# Patient Record
Sex: FEMALE | Race: WHITE | NOT HISPANIC OR LATINO | Employment: FULL TIME | URBAN - METROPOLITAN AREA
[De-identification: names, ages, dates, MRNs, and addresses within clinical notes are randomized per-mention and may not be internally consistent; named-entity substitution may affect disease eponyms.]

---

## 2023-02-28 LAB
ERYTHROCYTE DISTRIBUTION WIDTH (RATIO) BY AUTOMATED COUNT: 13.5 % (ref 11.5–14.5)
ERYTHROCYTE MEAN CORPUSCULAR HEMOGLOBIN CONCENTRATION (G/DL) BY AUTOMATED: 30.4 G/DL (ref 32–36)
ERYTHROCYTE MEAN CORPUSCULAR VOLUME (FL) BY AUTOMATED COUNT: 88 FL (ref 80–100)
ERYTHROCYTES (10*6/UL) IN BLOOD BY AUTOMATED COUNT: 4.22 X10E12/L (ref 4–5.2)
HEMATOCRIT (%) IN BLOOD BY AUTOMATED COUNT: 37.2 % (ref 36–46)
HEMOGLOBIN (G/DL) IN BLOOD: 11.3 G/DL (ref 12–16)
LEUKOCYTES (10*3/UL) IN BLOOD BY AUTOMATED COUNT: 6.5 X10E9/L (ref 4.4–11.3)
NRBC (PER 100 WBCS) BY AUTOMATED COUNT: 0 /100 WBC (ref 0–0)
PLATELETS (10*3/UL) IN BLOOD AUTOMATED COUNT: 244 X10E9/L (ref 150–450)

## 2023-05-31 ENCOUNTER — HOSPITAL ENCOUNTER (OUTPATIENT)
Dept: DATA CONVERSION | Facility: HOSPITAL | Age: 63
End: 2023-05-31
Attending: OBSTETRICS & GYNECOLOGY | Admitting: OBSTETRICS & GYNECOLOGY
Payer: COMMERCIAL

## 2023-05-31 DIAGNOSIS — N80.03 ADENOMYOSIS OF THE UTERUS: ICD-10-CM

## 2023-05-31 DIAGNOSIS — N85.02 ENDOMETRIAL INTRAEPITHELIAL NEOPLASIA (EIN): ICD-10-CM

## 2023-05-31 DIAGNOSIS — D27.1 BENIGN NEOPLASM OF LEFT OVARY: ICD-10-CM

## 2023-05-31 DIAGNOSIS — D25.9 LEIOMYOMA OF UTERUS, UNSPECIFIED: ICD-10-CM

## 2023-05-31 DIAGNOSIS — N83.201 UNSPECIFIED OVARIAN CYST, RIGHT SIDE: ICD-10-CM

## 2023-05-31 DIAGNOSIS — N85.00 ENDOMETRIAL HYPERPLASIA, UNSPECIFIED: ICD-10-CM

## 2023-05-31 DIAGNOSIS — Z88.0 ALLERGY STATUS TO PENICILLIN: ICD-10-CM

## 2023-06-13 LAB
COMPLETE PATHOLOGY REPORT: NORMAL
CONVERTED CLINICAL DIAGNOSIS-HISTORY: NORMAL
CONVERTED FINAL DIAGNOSIS: NORMAL
CONVERTED FINAL REPORT PDF LINK TO COPY AND PASTE: NORMAL
CONVERTED GROSS DESCRIPTION: NORMAL

## 2023-07-17 ASSESSMENT — PROMIS GLOBAL HEALTH SCALE
RATE_GENERAL_HEALTH: EXCELLENT
RATE_SOCIAL_SATISFACTION: VERY GOOD
RATE_QUALITY_OF_LIFE: EXCELLENT
EMOTIONAL_PROBLEMS: RARELY
RATE_AVERAGE_PAIN: 0
RATE_MENTAL_HEALTH: GOOD
RATE_PHYSICAL_HEALTH: EXCELLENT
CARRYOUT_SOCIAL_ACTIVITIES: VERY GOOD
RATE_AVERAGE_FATIGUE: MILD
CARRYOUT_PHYSICAL_ACTIVITIES: COMPLETELY

## 2023-07-18 ENCOUNTER — OFFICE VISIT (OUTPATIENT)
Dept: PRIMARY CARE | Facility: CLINIC | Age: 63
End: 2023-07-18
Payer: COMMERCIAL

## 2023-07-18 VITALS
HEART RATE: 68 BPM | WEIGHT: 156 LBS | BODY MASS INDEX: 25.07 KG/M2 | DIASTOLIC BLOOD PRESSURE: 80 MMHG | SYSTOLIC BLOOD PRESSURE: 128 MMHG | HEIGHT: 66 IN

## 2023-07-18 DIAGNOSIS — Z00.00 ROUTINE GENERAL MEDICAL EXAMINATION AT A HEALTH CARE FACILITY: Primary | ICD-10-CM

## 2023-07-18 LAB
ALANINE AMINOTRANSFERASE (SGPT) (U/L) IN SER/PLAS: 14 U/L (ref 7–45)
ALBUMIN (G/DL) IN SER/PLAS: 4.3 G/DL (ref 3.4–5)
ALKALINE PHOSPHATASE (U/L) IN SER/PLAS: 69 U/L (ref 33–136)
ANION GAP IN SER/PLAS: 13 MMOL/L (ref 10–20)
ASPARTATE AMINOTRANSFERASE (SGOT) (U/L) IN SER/PLAS: 18 U/L (ref 9–39)
BILIRUBIN TOTAL (MG/DL) IN SER/PLAS: 0.5 MG/DL (ref 0–1.2)
CALCIUM (MG/DL) IN SER/PLAS: 9.6 MG/DL (ref 8.6–10.6)
CARBON DIOXIDE, TOTAL (MMOL/L) IN SER/PLAS: 27 MMOL/L (ref 21–32)
CHLORIDE (MMOL/L) IN SER/PLAS: 103 MMOL/L (ref 98–107)
CHOLESTEROL (MG/DL) IN SER/PLAS: 268 MG/DL (ref 0–199)
CHOLESTEROL IN HDL (MG/DL) IN SER/PLAS: 88.1 MG/DL
CHOLESTEROL/HDL RATIO: 3
CREATININE (MG/DL) IN SER/PLAS: 0.64 MG/DL (ref 0.5–1.05)
ESTIMATED AVERAGE GLUCOSE FOR HBA1C: 103 MG/DL
GFR FEMALE: >90 ML/MIN/1.73M2
GLUCOSE (MG/DL) IN SER/PLAS: 85 MG/DL (ref 74–99)
HEMOGLOBIN A1C/HEMOGLOBIN TOTAL IN BLOOD: 5.2 %
LDL: 155 MG/DL (ref 0–99)
POTASSIUM (MMOL/L) IN SER/PLAS: 4.2 MMOL/L (ref 3.5–5.3)
PROTEIN TOTAL: 7.1 G/DL (ref 6.4–8.2)
SODIUM (MMOL/L) IN SER/PLAS: 139 MMOL/L (ref 136–145)
THYROTROPIN (MIU/L) IN SER/PLAS BY DETECTION LIMIT <= 0.05 MIU/L: 2.29 MIU/L (ref 0.44–3.98)
TRIGLYCERIDE (MG/DL) IN SER/PLAS: 126 MG/DL (ref 0–149)
UREA NITROGEN (MG/DL) IN SER/PLAS: 18 MG/DL (ref 6–23)
VLDL: 25 MG/DL (ref 0–40)

## 2023-07-18 PROCEDURE — 99396 PREV VISIT EST AGE 40-64: CPT | Performed by: STUDENT IN AN ORGANIZED HEALTH CARE EDUCATION/TRAINING PROGRAM

## 2023-07-18 PROCEDURE — 80053 COMPREHEN METABOLIC PANEL: CPT

## 2023-07-18 PROCEDURE — 80061 LIPID PANEL: CPT

## 2023-07-18 PROCEDURE — 1036F TOBACCO NON-USER: CPT | Performed by: STUDENT IN AN ORGANIZED HEALTH CARE EDUCATION/TRAINING PROGRAM

## 2023-07-18 PROCEDURE — 84443 ASSAY THYROID STIM HORMONE: CPT

## 2023-07-18 PROCEDURE — 83036 HEMOGLOBIN GLYCOSYLATED A1C: CPT

## 2023-07-18 PROCEDURE — 85025 COMPLETE CBC W/AUTO DIFF WBC: CPT

## 2023-07-18 ASSESSMENT — PATIENT HEALTH QUESTIONNAIRE - PHQ9
1. LITTLE INTEREST OR PLEASURE IN DOING THINGS: SEVERAL DAYS
10. IF YOU CHECKED OFF ANY PROBLEMS, HOW DIFFICULT HAVE THESE PROBLEMS MADE IT FOR YOU TO DO YOUR WORK, TAKE CARE OF THINGS AT HOME, OR GET ALONG WITH OTHER PEOPLE: NOT DIFFICULT AT ALL
SUM OF ALL RESPONSES TO PHQ9 QUESTIONS 1 AND 2: 1
2. FEELING DOWN, DEPRESSED OR HOPELESS: NOT AT ALL

## 2023-07-18 ASSESSMENT — ENCOUNTER SYMPTOMS
LOSS OF SENSATION IN FEET: 0
DEPRESSION: 0
OCCASIONAL FEELINGS OF UNSTEADINESS: 0

## 2023-07-18 NOTE — PROGRESS NOTES
"Subjective   Reason for Visit: Caroline Corbin is an 63 y.o. female here for a Medicare Wellness visit.               HPI    Uterine fibroids: recent hysterectomy, recovering well, No issues, recovering well from this    HLD: high on labs in past, does not head a lot of re meats. Talked about calcium score and reducing    Utd on colonoscopy and mammogram    Patient Care Team:  Zan Betts DO as PCP - General (Internal Medicine)     Review of Systems   All other systems reviewed and are negative.      Objective   Vitals:  /80   Pulse 68   Ht 1.676 m (5' 6\")   Wt 70.8 kg (156 lb)   BMI 25.18 kg/m²       Physical Exam  Constitutional:       Appearance: Normal appearance.   HENT:      Head: Normocephalic and atraumatic.      Right Ear: Tympanic membrane and ear canal normal.      Left Ear: Tympanic membrane and ear canal normal.      Mouth/Throat:      Mouth: Mucous membranes are moist.      Pharynx: Oropharynx is clear.   Eyes:      Extraocular Movements: Extraocular movements intact.      Conjunctiva/sclera: Conjunctivae normal.      Pupils: Pupils are equal, round, and reactive to light.   Cardiovascular:      Rate and Rhythm: Normal rate and regular rhythm.      Pulses: Normal pulses.      Heart sounds: Normal heart sounds.   Pulmonary:      Effort: Pulmonary effort is normal.      Breath sounds: Normal breath sounds.   Abdominal:      General: Abdomen is flat. Bowel sounds are normal.      Palpations: Abdomen is soft.   Musculoskeletal:         General: Normal range of motion.      Cervical back: Normal range of motion and neck supple.   Skin:     General: Skin is warm and dry.      Capillary Refill: Capillary refill takes 2 to 3 seconds.   Neurological:      General: No focal deficit present.      Mental Status: She is alert and oriented to person, place, and time. Mental status is at baseline.   Psychiatric:         Mood and Affect: Mood normal.         Behavior: Behavior normal.         Thought " Content: Thought content normal.         Judgment: Judgment normal.         Assessment/Plan   1. Routine general medical examination at a health care facility    - CBC and Auto Differential  - Comprehensive Metabolic Panel  - Lipid Panel  - TSH with reflex to Free T4 if abnormal  - Hemoglobin A1C  - check HDL, discussed calcium score and reduction methods  - mammogram UTD  - colonoscopy UTD

## 2023-07-19 ENCOUNTER — TELEPHONE (OUTPATIENT)
Dept: PRIMARY CARE | Facility: CLINIC | Age: 63
End: 2023-07-19
Payer: COMMERCIAL

## 2023-07-19 LAB
BASOPHILS (10*3/UL) IN BLOOD BY AUTOMATED COUNT: 0.03 X10E9/L (ref 0–0.1)
BASOPHILS/100 LEUKOCYTES IN BLOOD BY AUTOMATED COUNT: 0.6 % (ref 0–2)
EOSINOPHILS (10*3/UL) IN BLOOD BY AUTOMATED COUNT: 0.1 X10E9/L (ref 0–0.7)
EOSINOPHILS/100 LEUKOCYTES IN BLOOD BY AUTOMATED COUNT: 2.1 % (ref 0–6)
ERYTHROCYTE DISTRIBUTION WIDTH (RATIO) BY AUTOMATED COUNT: 14.2 % (ref 11.5–14.5)
ERYTHROCYTE MEAN CORPUSCULAR HEMOGLOBIN CONCENTRATION (G/DL) BY AUTOMATED: 29.7 G/DL (ref 32–36)
ERYTHROCYTE MEAN CORPUSCULAR VOLUME (FL) BY AUTOMATED COUNT: 89 FL (ref 80–100)
ERYTHROCYTES (10*6/UL) IN BLOOD BY AUTOMATED COUNT: 4.26 X10E12/L (ref 4–5.2)
HEMATOCRIT (%) IN BLOOD BY AUTOMATED COUNT: 38 % (ref 36–46)
HEMOGLOBIN (G/DL) IN BLOOD: 11.3 G/DL (ref 12–16)
IMMATURE GRANULOCYTES/100 LEUKOCYTES IN BLOOD BY AUTOMATED COUNT: 0 % (ref 0–0.9)
LEUKOCYTES (10*3/UL) IN BLOOD BY AUTOMATED COUNT: 4.8 X10E9/L (ref 4.4–11.3)
LYMPHOCYTES (10*3/UL) IN BLOOD BY AUTOMATED COUNT: 1.81 X10E9/L (ref 1.2–4.8)
LYMPHOCYTES/100 LEUKOCYTES IN BLOOD BY AUTOMATED COUNT: 37.5 % (ref 13–44)
MONOCYTES (10*3/UL) IN BLOOD BY AUTOMATED COUNT: 0.37 X10E9/L (ref 0.1–1)
MONOCYTES/100 LEUKOCYTES IN BLOOD BY AUTOMATED COUNT: 7.7 % (ref 2–10)
NEUTROPHILS (10*3/UL) IN BLOOD BY AUTOMATED COUNT: 2.52 X10E9/L (ref 1.2–7.7)
NEUTROPHILS/100 LEUKOCYTES IN BLOOD BY AUTOMATED COUNT: 52.1 % (ref 40–80)
NRBC (PER 100 WBCS) BY AUTOMATED COUNT: 0 /100 WBC (ref 0–0)
PLATELETS (10*3/UL) IN BLOOD AUTOMATED COUNT: 262 X10E9/L (ref 150–450)

## 2023-07-19 NOTE — TELEPHONE ENCOUNTER
----- Message from Zan Betts DO sent at 7/19/2023 10:08 AM EDT -----  Cholesterol is still elevated  Suggest starting statin or CT calcium score

## 2023-07-19 NOTE — TELEPHONE ENCOUNTER
Spoke with patient is aware of results and would like to start a statin medication, please send script to [facility] pharmacy on file.

## 2023-07-20 DIAGNOSIS — E78.5 HYPERLIPIDEMIA, UNSPECIFIED HYPERLIPIDEMIA TYPE: Primary | ICD-10-CM

## 2023-07-20 RX ORDER — ATORVASTATIN CALCIUM 40 MG/1
40 TABLET, FILM COATED ORAL DAILY
Qty: 30 TABLET | Refills: 5 | Status: SHIPPED | OUTPATIENT
Start: 2023-07-20 | End: 2023-10-03 | Stop reason: ALTCHOICE

## 2023-07-20 NOTE — PROGRESS NOTES
Subjective   Reason for Visit: Caroline Corbin is an 63 y.o. female here for a Medicare Wellness visit.               HPI    Patient Care Team:  Zan Betts DO as PCP - General (Internal Medicine)     Review of Systems    Objective   Vitals:  There were no vitals taken for this visit.      Physical Exam    Assessment/Plan   Problem List Items Addressed This Visit    None

## 2023-09-07 VITALS — BODY MASS INDEX: 25.33 KG/M2 | HEIGHT: 66 IN | WEIGHT: 157.63 LBS

## 2023-10-02 NOTE — OP NOTE
Post Operative Note:     PreOp Diagnosis: Endometrial hyperplasia   Post-Procedure Diagnosis: Endometrial hyperplasia   Procedure: 1.  Robotic total laparoscopic hysterectomy   2.  Bilateral salpingo-oophorectomy  3.  Lymph node mapping  4.  Bilateral sentinel lymph node dissection   Surgeon: Zbigniew   Resident/Fellow/Other Assistant: Kellee   Anesthesia: GET   I.V. Fluids: 3000cc crystalloid   Estimated Blood Loss (mL): 100cc   Specimen: yes. Uterus, tubes, ovaries, bilateral  sentinel lymph nodes   Findings: Normal uterus, tubes, ovaries.  Bilateral  sentinel lymph node mapping to obturator space   Urine Output: 275cc     Operative Report Dictated:  Dictation: not applicable - note contains Operative  Report   Operative Report:    The patient was identified in preoperative holding area with correct name and medical record number. Informed consent was reviewed and all remaining questions answered.  The patient was taken to the operating room where she had sequential compression devices  placed and received perioperative antibiotics.  She was placed under general anesthesia  without complications and then positioned in low lithotomy position.  After  sterile prep and draping, a Peña catheter was placed.  A single-tooth tenaculum was placed on the anterior lip of the cervix. A 1.25:1 solution of indocyanine green and sterile water was injected into the cervix at 3 and 9 o'clock both superficial and  deep for sentinel lymph node mapping. The uterus was then sounded to a depth of 7cm. The cervix was sequentially dilated and a Sonia uterine manipulator was placed without difficulty. The tenaculum and speculum were removed from the vagina.     Attention was then turned to the patient's abdomen where a 8mm incision was made in the LUQ. The anterior fascia was identified under direct visualization and grasped with tonsil  clamp x2 and incised sharply.  An 8mm AirSeal port was then inserted and pneumoperitoneum  established without difficulty. Entry site was inspected and surveillance of the abdomen performed, demonstrating no evidence of bowel or vascular injury at entry.  Findings are as documented, with normal intraabdominal survey.  The decision was made to proceed with placement of the robotic trocars: two 8mm right-sided ports, an umbilical port and a left lateral robotic port were placed under direct visualization  without difficulty at the level of the umbilicus. At this point, the patient was placed in steep Trendelenburg position.  The bowel was easily swept out of the pelvis. Robot was docked without any difficulty.      The uterus was then anteverted.  The round ligament was identified on the left side and the posterior leaf of the broad ligament was transected using monopolar scissors. The retroperitoneum was entered and the peritoneum was incised in a plane lateral  and parallel to the ovarian blood supply.  The infundibulopelvic ligament was skeletonized. The ureter was visualized and well posterior to the ovarian blood supply.   Successful lymph node mapping was noted bilaterally and the decision was made to proceed  with sentinel lymph node dissection.     The retroperitoneal space was opened and explored on right side.  The bifurcation of the common and iliac was identified.  The peritoneum was incised anterior to the external iliac artery, and the lymph node packet including sentinel node along the external  iliac vein was isolated.  The ureter and the superior vesical artery were retracted medially.  The obturator nerve was identified.  Noemi tissue was dissected off of the obturator nerve and the external iliac vein.  The pelvic lymph nodes were then transected  distal to the bifurcation of the common iliac vessels.    Attention was turned to the left side, where in a similar fashion the retroperitoneal space was entered in a plane lateral to the ovarian blood supply and the IP ligament was isolated and  divided with the bipolar device after careful visualization of  the ureter. The sentinel nodes mapped at the obturator space.  We identified the obturator nerve. We dissected the ureter and superior vesical artery medially.  Lymph node bearing tissue was carefully dissected from the external iliac vein and the obturator  nerve.  It was transected proximally before the bifurcation of the iliac.      We then proceeded with completion of the hysterectomy. The round ligament was transected on the left side using monopolar scissors. A window was made under the infundibulopelvic ligament with the monopolar scissors and the IP ligament was desiccated and  transected using fenestrated bipolar device. A bladder flap was then created anteriorly on the left side using monopolar scissors as well.  After skeletonizing the uterine artery, it was taken with bipolar device.  The cardinal and uterosacral ligaments  were divided using bipolar and monopolar devices, with each pedicle created medial to the previous to prevent ureteral injury.    Attention was then turned to the patient's right side where in a similar fashion, the steps were repeated including desiccating and transecting the round ligament and dividing and sealing the infundibulopelvic ligament. The bladder flap was also created  on the right side.  The uterine arteries were skeletonized and were taken using fenestrated bipolar device.  The cardinal and uterosacral ligaments were divided in a similar manner as the left side.  At this point, the uterine manipulator was clearly  palpated with the robotic arm and the cervicovaginal junction was incised circumferentially with monopolar scissors without any difficulty.  The uterus with ovaries and fallopian tubes was then delivered through the vagina without any difficulty and was  sent for frozen section.  At this point, all pedicles were inspected and were completely hemostatic.  A glove was placed in the vagina to  maintain pneumoperitoneum.     The vaginal cuff was re-approximated using #0 V-lock suture in a running fashion.  Copious irrigation was performed and no additional bleeding was noted. The decision was then made to proceed with closure. The robot was undocked. After reversal of the  pneumoperitoneum and removal of all ports, the skin incisions were closed with 4-0 Monocryl in a subcuticular fashion. Steristrips and a sterile dressing was placed over each of the closed incision sites. The patient tolerated the procedure well.    Sponge, lap, and needle counts were correct x2. She was reversed from her general anesthesia and was taken to the recovery room in stable condition.      Christopher Goodson MD    Attestation:   Note Completion:  I am a: Resident/Fellow   Attending Attestation I was present for the entire procedure          Electronic Signatures:  Christopher Goodson (MD (Fellow))  (Signed 31-May-2023 12:05)   Authored: Post Operative Note, Note Completion  Renetta Coy)  (Signed 01-Jun-2023 13:27)   Authored: Note Completion   Co-Signer: Post Operative Note, Note Completion      Last Updated: 01-Jun-2023 13:27 by Renetta Coy)

## 2023-10-04 ENCOUNTER — OFFICE VISIT (OUTPATIENT)
Dept: PRIMARY CARE | Facility: CLINIC | Age: 63
End: 2023-10-04
Payer: COMMERCIAL

## 2023-10-04 VITALS
HEIGHT: 66 IN | SYSTOLIC BLOOD PRESSURE: 118 MMHG | WEIGHT: 156 LBS | BODY MASS INDEX: 25.07 KG/M2 | DIASTOLIC BLOOD PRESSURE: 80 MMHG

## 2023-10-04 DIAGNOSIS — Z00.00 HEALTH CARE MAINTENANCE: ICD-10-CM

## 2023-10-04 DIAGNOSIS — E78.2 MIXED HYPERLIPIDEMIA: Primary | ICD-10-CM

## 2023-10-04 DIAGNOSIS — Z90.710 H/O TOTAL HYSTERECTOMY: ICD-10-CM

## 2023-10-04 DIAGNOSIS — M17.9 OSTEOARTHRITIS OF KNEE, UNSPECIFIED LATERALITY, UNSPECIFIED OSTEOARTHRITIS TYPE: ICD-10-CM

## 2023-10-04 PROCEDURE — 90471 IMMUNIZATION ADMIN: CPT | Performed by: STUDENT IN AN ORGANIZED HEALTH CARE EDUCATION/TRAINING PROGRAM

## 2023-10-04 PROCEDURE — 99204 OFFICE O/P NEW MOD 45 MIN: CPT | Performed by: STUDENT IN AN ORGANIZED HEALTH CARE EDUCATION/TRAINING PROGRAM

## 2023-10-04 PROCEDURE — 90686 IIV4 VACC NO PRSV 0.5 ML IM: CPT | Performed by: STUDENT IN AN ORGANIZED HEALTH CARE EDUCATION/TRAINING PROGRAM

## 2023-10-04 PROCEDURE — 1036F TOBACCO NON-USER: CPT | Performed by: STUDENT IN AN ORGANIZED HEALTH CARE EDUCATION/TRAINING PROGRAM

## 2023-10-04 RX ORDER — DICLOFENAC SODIUM 75 MG/1
75 TABLET, DELAYED RELEASE ORAL 2 TIMES DAILY
COMMUNITY
Start: 2019-07-02

## 2023-10-04 NOTE — PROGRESS NOTES
"Subjective   Patient ID: Caroline Corbin is a 63 y.o. female who presents for Establish Care.    HPI   Initial PCP visit. Referral from Dr. Maria.    Mrs. Caroline Corbin is a 63 year old female with h/o total hysterectomy and BSO 2023, knee OA s/p TKA (Dr. Maria), HLD.    Her previous PCP recommended that patient start statin medication, but she is reluctant to do so. She has been trying to eat a healthier diet for the past few months.     , lives with  in [place], 3 adult sons, multiple grand kids in the area. Works as a , thinking of retiring soon. Walks frequently for exercise. Never smoker. Social alcohol.    No significant family medical history reported.  Review of Systems  12-point ROS reviewed and was negative unless otherwise noted in HPI.    Objective   BP (!) 160/92   Ht 1.676 m (5' 6\")   Wt 70.8 kg (156 lb)   BMI 25.18 kg/m²     Physical Exam  GEN: conversant, NAD  HEENT: PERRL, EOMI, MMM  NECK: supple, no carotid bruits appreciated b/l  CV: S1, S2, RRR  PULM: CTAB  ABD: soft, NT, ND  NEURO: no new gross focal deficits  EXT: no sig LE edema  PSYCH: appropriate affect    Assessment/Plan     #HLD: working on healthy diet, exercise. Repeat lipids next visit.    #Knee OA: sees Dr. Maria from [facility]    #Health Maintenance: Repeat colonoscopy is due in 2028. Mammogram 2/2023. H/o total hysterectomy. Normal DEXA in 2023. Advised RSV, shingrix and yearly flu shot. Recent labs reviewed.    RTC 6-12 mo     "

## 2024-02-28 ENCOUNTER — OFFICE VISIT (OUTPATIENT)
Dept: OBSTETRICS AND GYNECOLOGY | Facility: CLINIC | Age: 64
End: 2024-02-28
Payer: COMMERCIAL

## 2024-02-28 VITALS
WEIGHT: 156 LBS | DIASTOLIC BLOOD PRESSURE: 82 MMHG | HEIGHT: 66 IN | BODY MASS INDEX: 25.07 KG/M2 | SYSTOLIC BLOOD PRESSURE: 120 MMHG

## 2024-02-28 DIAGNOSIS — Z01.419 ENCOUNTER FOR GYNECOLOGICAL EXAMINATION WITHOUT ABNORMAL FINDING: Primary | ICD-10-CM

## 2024-02-28 PROCEDURE — 99396 PREV VISIT EST AGE 40-64: CPT | Performed by: OBSTETRICS & GYNECOLOGY

## 2024-02-28 PROCEDURE — 1036F TOBACCO NON-USER: CPT | Performed by: OBSTETRICS & GYNECOLOGY

## 2024-02-28 NOTE — PROGRESS NOTES
Subjective   Caroline Corbin is a 64 y.o. female here for a routine exam.   She had a hysterectomy with BSO May 31, 2023 with Dr. Coy for focal atypical endometrial hyperplasia.  Her last Pap in December 2021 was HPV negative.    A bone density in February 2023 was normal.  She has no bleeding or discharge.  No dysuria or change in bowel habits.  No pelvic pain.  She is current on her colonoscopy from February 2023.  She does mention a rash between the breasts.  Personal health questionnaire reviewed: yes.     Gynecologic History  Patient's last menstrual period was 01/01/2014 (approximate).  Contraception: status post hysterectomy  Last Pap: 12/29/21. Results were: normal  Last mammogram: 2/27/23. Results were: normal    Obstetric History  OB History   No obstetric history on file.       Objective   Constitutional: Alert and in no acute distress. Well developed, well nourished.   Head and Face: Head and face: Normal.    Eyes: Normal external exam - nonicteric sclera, extraocular movements intact (EOMI) and no ptosis.   Neck: No neck asymmetry. Supple. Thyroid not enlarged and there were no palpable thyroid nodules.    Pulmonary: No respiratory distress.   Chest: Breasts: Normal appearance, no nipple discharge and no skin changes. Palpation of breasts and axillae: No palpable mass and no axillary lymphadenopathy.   Abdomen: Soft nontender; no abdominal mass palpated. No organomegaly. No hernias.   Genitourinary: External genitalia: Normal. No inguinal lymphadenopathy. Bartholin's Urethral and Skenes Glands: Normal. Urethra: Normal.  Bladder: Normal on palpation. Vagina: Normal. Cervix: Absent  Uterus: Absent.  Right Adnexa/parametria: Absent  Left Adnexa/parametria: Absent inspection of Perianal Area: Normal.   Musculoskeletal: No joint swelling seen, normal movements of all extremities.   Skin: Normal skin color and pigmentation, normal skin turgor, and no rash.   Neurologic: Non-focal. Grossly intact.    Psychiatric: Alert and oriented x 3. Affect normal to patient baseline. Mood: Appropriate.  Physical Exam     Assessment/Plan   Healthy female exam.  This is a 64-year-old female with a normal exam.  She is now status post hysterectomy with BSO for atypical endometrial hyperplasia.  No further Pap smears are needed, she is also high risk HPV negative.   Her routine mammogram was ordered with tomosynthesis.  She is current on her bone density test and colonoscopy.  I will see her in 1 year.  Mammogram ordered.

## 2024-03-13 ENCOUNTER — HOSPITAL ENCOUNTER (OUTPATIENT)
Dept: RADIOLOGY | Facility: CLINIC | Age: 64
Discharge: HOME | End: 2024-03-13
Payer: COMMERCIAL

## 2024-03-13 DIAGNOSIS — Z01.419 ENCOUNTER FOR GYNECOLOGICAL EXAMINATION WITHOUT ABNORMAL FINDING: ICD-10-CM

## 2024-03-13 PROCEDURE — 77067 SCR MAMMO BI INCL CAD: CPT | Performed by: RADIOLOGY

## 2024-03-13 PROCEDURE — 77063 BREAST TOMOSYNTHESIS BI: CPT | Performed by: RADIOLOGY

## 2024-03-13 PROCEDURE — 77067 SCR MAMMO BI INCL CAD: CPT

## 2024-10-08 ENCOUNTER — APPOINTMENT (OUTPATIENT)
Dept: PRIMARY CARE | Facility: CLINIC | Age: 64
End: 2024-10-08
Payer: COMMERCIAL

## 2024-10-08 ENCOUNTER — LAB (OUTPATIENT)
Dept: LAB | Facility: LAB | Age: 64
End: 2024-10-08
Payer: COMMERCIAL

## 2024-10-08 VITALS
SYSTOLIC BLOOD PRESSURE: 134 MMHG | WEIGHT: 155 LBS | DIASTOLIC BLOOD PRESSURE: 82 MMHG | HEIGHT: 66 IN | BODY MASS INDEX: 24.91 KG/M2

## 2024-10-08 DIAGNOSIS — R03.0 ELEVATED BLOOD PRESSURE READING: ICD-10-CM

## 2024-10-08 DIAGNOSIS — E78.2 MIXED HYPERLIPIDEMIA: ICD-10-CM

## 2024-10-08 DIAGNOSIS — Z13.220 LIPID SCREENING: ICD-10-CM

## 2024-10-08 DIAGNOSIS — Z00.00 HEALTH CARE MAINTENANCE: Primary | ICD-10-CM

## 2024-10-08 DIAGNOSIS — Z00.00 ENCOUNTER FOR PREVENTIVE HEALTH EXAMINATION: ICD-10-CM

## 2024-10-08 DIAGNOSIS — Z00.00 HEALTH CARE MAINTENANCE: ICD-10-CM

## 2024-10-08 LAB
ALBUMIN SERPL BCP-MCNC: 4.2 G/DL (ref 3.4–5)
ALP SERPL-CCNC: 63 U/L (ref 33–136)
ALT SERPL W P-5'-P-CCNC: 14 U/L (ref 7–45)
ANION GAP SERPL CALC-SCNC: 11 MMOL/L (ref 10–20)
AST SERPL W P-5'-P-CCNC: 18 U/L (ref 9–39)
BILIRUB SERPL-MCNC: 0.6 MG/DL (ref 0–1.2)
BUN SERPL-MCNC: 16 MG/DL (ref 6–23)
CALCIUM SERPL-MCNC: 9.6 MG/DL (ref 8.6–10.6)
CHLORIDE SERPL-SCNC: 103 MMOL/L (ref 98–107)
CHOLEST SERPL-MCNC: 259 MG/DL (ref 0–199)
CHOLESTEROL/HDL RATIO: 2.9
CO2 SERPL-SCNC: 30 MMOL/L (ref 21–32)
CREAT SERPL-MCNC: 0.63 MG/DL (ref 0.5–1.05)
EGFRCR SERPLBLD CKD-EPI 2021: >90 ML/MIN/1.73M*2
ERYTHROCYTE [DISTWIDTH] IN BLOOD BY AUTOMATED COUNT: 14.2 % (ref 11.5–14.5)
EST. AVERAGE GLUCOSE BLD GHB EST-MCNC: 97 MG/DL
GLUCOSE SERPL-MCNC: 78 MG/DL (ref 74–99)
HBA1C MFR BLD: 5 %
HCT VFR BLD AUTO: 36.9 % (ref 36–46)
HDLC SERPL-MCNC: 88.7 MG/DL
HGB BLD-MCNC: 11.4 G/DL (ref 12–16)
LDLC SERPL CALC-MCNC: 146 MG/DL
MCH RBC QN AUTO: 26.6 PG (ref 26–34)
MCHC RBC AUTO-ENTMCNC: 30.9 G/DL (ref 32–36)
MCV RBC AUTO: 86 FL (ref 80–100)
NON HDL CHOLESTEROL: 170 MG/DL (ref 0–149)
NRBC BLD-RTO: 0 /100 WBCS (ref 0–0)
PLATELET # BLD AUTO: 247 X10*3/UL (ref 150–450)
POTASSIUM SERPL-SCNC: 4.1 MMOL/L (ref 3.5–5.3)
PROT SERPL-MCNC: 7 G/DL (ref 6.4–8.2)
RBC # BLD AUTO: 4.29 X10*6/UL (ref 4–5.2)
SODIUM SERPL-SCNC: 140 MMOL/L (ref 136–145)
TRIGL SERPL-MCNC: 121 MG/DL (ref 0–149)
TSH SERPL-ACNC: 1.75 MIU/L (ref 0.44–3.98)
VLDL: 24 MG/DL (ref 0–40)
WBC # BLD AUTO: 4.9 X10*3/UL (ref 4.4–11.3)

## 2024-10-08 PROCEDURE — 83036 HEMOGLOBIN GLYCOSYLATED A1C: CPT

## 2024-10-08 PROCEDURE — 3008F BODY MASS INDEX DOCD: CPT | Performed by: STUDENT IN AN ORGANIZED HEALTH CARE EDUCATION/TRAINING PROGRAM

## 2024-10-08 PROCEDURE — 90471 IMMUNIZATION ADMIN: CPT | Performed by: STUDENT IN AN ORGANIZED HEALTH CARE EDUCATION/TRAINING PROGRAM

## 2024-10-08 PROCEDURE — 99396 PREV VISIT EST AGE 40-64: CPT | Performed by: STUDENT IN AN ORGANIZED HEALTH CARE EDUCATION/TRAINING PROGRAM

## 2024-10-08 PROCEDURE — 1036F TOBACCO NON-USER: CPT | Performed by: STUDENT IN AN ORGANIZED HEALTH CARE EDUCATION/TRAINING PROGRAM

## 2024-10-08 PROCEDURE — 80061 LIPID PANEL: CPT

## 2024-10-08 PROCEDURE — 90656 IIV3 VACC NO PRSV 0.5 ML IM: CPT | Performed by: STUDENT IN AN ORGANIZED HEALTH CARE EDUCATION/TRAINING PROGRAM

## 2024-10-08 PROCEDURE — 84443 ASSAY THYROID STIM HORMONE: CPT

## 2024-10-08 PROCEDURE — 80053 COMPREHEN METABOLIC PANEL: CPT

## 2024-10-08 PROCEDURE — 36415 COLL VENOUS BLD VENIPUNCTURE: CPT

## 2024-10-08 PROCEDURE — 85027 COMPLETE CBC AUTOMATED: CPT

## 2024-10-08 NOTE — PROGRESS NOTES
"Subjective   Patient ID: Caroline Corbin is a 64 y.o. female who presents for Annual Exam.    HPI   Yearly physical.    Here for a physical today. Recently had a right elbow cyst removed by dermatology 1-2 weeks ago. Doing well, no issues. Not taking any medications. Works as a , feels she could be more active.    Review of Systems  10 systems reviewed and negative except otherwise noted above in HPI    Objective   /78   Ht 1.676 m (5' 6\")   Wt 70.3 kg (155 lb)   LMP 01/01/2014 (Approximate) Comment: hyst 5/2023  BMI 25.02 kg/m²     Physical Exam  GEN: conversant, NAD  HEENT: PERRL, EOMI, MMM  NECK: supple  CV: S1, S2, RRR  PULM: CTAB  ABD: soft, NT, ND  NEURO: no new gross focal deficits  EXT: no sig LE edema  PSYCH: appropriate affect    Assessment/Plan     #HLD: working on healthy diet, exercise. Repeat lipids today. Discussed that if LDL remains elevated, recommend starting rosuvastatin, discussed SE profile.    #Elevated BP reading: she will start monitoring BP at home. Return if remains elevated.     #Knee OA: sees Dr. Maria from ortho     #Health Maintenance: Repeat colonoscopy is due in 2028. Mammogram 2/2023, being followed by OBGYN. H/o total hysterectomy. Normal DEXA in 2023. Advised RSV, shingrix and yearly flu shot. Routine labs..     RTC 6-12 mo     "

## 2024-10-21 ENCOUNTER — TELEPHONE (OUTPATIENT)
Dept: PRIMARY CARE | Facility: CLINIC | Age: 64
End: 2024-10-21
Payer: COMMERCIAL

## 2024-10-21 DIAGNOSIS — H10.30 ACUTE CONJUNCTIVITIS, UNSPECIFIED ACUTE CONJUNCTIVITIS TYPE, UNSPECIFIED LATERALITY: Primary | ICD-10-CM

## 2024-10-21 RX ORDER — CIPROFLOXACIN HYDROCHLORIDE 3 MG/ML
1 SOLUTION/ DROPS OPHTHALMIC 4 TIMES DAILY
Qty: 10 ML | Refills: 0 | Status: SHIPPED | OUTPATIENT
Start: 2024-10-21 | End: 2024-10-28

## 2024-10-21 NOTE — TELEPHONE ENCOUNTER
Was just in and eye is red wanted to know if you can send in drops for pink eye to [facility] [address]

## 2024-10-24 ENCOUNTER — APPOINTMENT (OUTPATIENT)
Dept: VASCULAR SURGERY | Facility: CLINIC | Age: 64
End: 2024-10-24
Payer: COMMERCIAL

## 2024-10-24 VITALS
HEIGHT: 66 IN | OXYGEN SATURATION: 100 % | BODY MASS INDEX: 24.59 KG/M2 | SYSTOLIC BLOOD PRESSURE: 130 MMHG | HEART RATE: 67 BPM | DIASTOLIC BLOOD PRESSURE: 80 MMHG | WEIGHT: 153 LBS

## 2024-10-24 DIAGNOSIS — M79.89 LEG SWELLING: ICD-10-CM

## 2024-10-24 DIAGNOSIS — I83.893 VARICOSE VEINS OF BOTH LOWER EXTREMITIES WITH COMPLICATIONS: Primary | ICD-10-CM

## 2024-10-24 PROCEDURE — 99203 OFFICE O/P NEW LOW 30 MIN: CPT | Performed by: SURGERY

## 2024-10-24 PROCEDURE — 3008F BODY MASS INDEX DOCD: CPT | Performed by: SURGERY

## 2024-10-27 PROBLEM — I83.899 VARICOSE VEINS OF LOWER EXTREMITIES WITH COMPLICATIONS: Status: ACTIVE | Noted: 2024-10-27

## 2024-10-27 NOTE — PROGRESS NOTES
"Caroline Corbin is a 64 y.o. female     Subjective   This patient presents today as a new consult for evaluation of varicose veins bilateral lower extremities.  Apparently, she did have her veins evaluated at the vein center in [place].  This was about 5 years ago.  She did have treatment on her legs.  She has never smoked.  She has a surgical history of a hysterectomy and a joint replacement that was a left total hip replacement..  She is allergic to penicillin.  She is 5 foot 6 and weighs 153 pounds.  She is a teacher.  She currently denies any fever chills nausea vomiting or headache.  She states that she does have concern with her varicose veins.  She is currently 64 years old.         Objective     Vitals:    10/24/24 0900   BP: 130/80   Pulse: 67   SpO2: 100%      Physical Exam  This patient is alert and oriented x3 her head is normocephalic neck is soft and supple without palpable lymph nodes.  Heart is regular rate.  Lungs are clear.  Abdomen soft with positive bowel sounds.  There is no pain on palpation.  Upper and lower extremities have adequate range of motion with palpable brachial radial femoral and popliteal pulses.  Skin turgor is adequate.  Psychologically the patient appears to be acting appropriately.  She does have some small varicosities x 2 involving her right leg.  She also has some spider veins bilaterally.   Blood pressure 130/80, pulse 67, height 1.676 m (5' 6\"), weight 69.4 kg (153 lb), last menstrual period 01/01/2014, SpO2 100%.            Patient Active Problem List    Diagnosis Date Noted    Routine general medical examination at a health care facility 07/18/2023          Current Outpatient Medications:     ciprofloxacin (Ciloxan) 0.3 % ophthalmic solution, Administer 1 drop into both eyes 4 times a day for 7 days., Disp: 10 mL, Rfl: 0    diclofenac (Voltaren) 75 mg EC tablet, Take 1 tablet (75 mg) by mouth twice a day., Disp: , Rfl:      Lab Results   Component Value " Date    WBC 4.9 10/08/2024    HGB 11.4 (L) 10/08/2024    HCT 36.9 10/08/2024     10/08/2024    CHOL 259 (H) 10/08/2024    TRIG 121 10/08/2024    HDL 88.7 10/08/2024    ALT 14 10/08/2024    AST 18 10/08/2024     10/08/2024    K 4.1 10/08/2024     10/08/2024    CREATININE 0.63 10/08/2024    BUN 16 10/08/2024    CO2 30 10/08/2024    TSH 1.75 10/08/2024    INR 1.1 05/21/2021    HGBA1C 5.0 10/08/2024       BI mammo bilateral screening tomosynthesis    Result Date: 3/16/2024  Interpreted By:  Gracia Poole, STUDY: BI MAMMO BILATERAL SCREENING TOMOSYNTHESIS;  3/13/2024 3:19 pm   ACCESSION NUMBER(S): [id number]   ORDERING CLINICIAN: MAYA WILSON   INDICATION: Screening.   COMPARISON: 02/23/2023, 02/21/2022   FINDINGS: 2D and tomosynthesis images were reviewed at 1 mm slice thickness.   Density:  There are areas of scattered fibroglandular tissue.   No suspicious masses or calcifications are identified.       No mammographic evidence of malignancy.   BI-RADS CATEGORY:   BI-RADS Category:  1 Negative. Recommendation:  Annual Screening. Recommended Date:  1 Year. Laterality:  Bilateral.   For any future breast imaging appointments, please call [phone] ([phone]).     MACRO: None   Signed by: Gracia Poole 3/16/2024 10:43 AM Dictation workstation:   [id number]                Assessment/Plan   Active Problems:  There are no active Hospital Problems.      This patient presents today as a new consult for evaluation of bilateral lower extremity varicose veins.  She had treatment involving her veins about 5 years ago at a vein clinic in [place].  She is concerned about her veins.  She was recommended further venous intervention that she refused 5 years ago.  She does complain of some achiness and heaviness of her legs.  She is a teacher.  She has also had a left total hip replacement.  She does have 2 relatively small varicosities identified involving her right leg.  She also has  some spider veins noted bilaterally.  At this time, I am encouraging her to wear her compression stockings on a consistent basis and to also exercise her lower extremities at least 5 days a week.  She needs to periodically elevate her legs after she gets done working.  I would like to obtain a venous duplex scan with reflux study and follow-up with her in 3 months.  Thank you very much for allowing me to take part in the care of your patient.  Sincerely years, Dr. Aubrey Kemp, DO

## 2025-02-05 ENCOUNTER — APPOINTMENT (OUTPATIENT)
Dept: VASCULAR SURGERY | Facility: CLINIC | Age: 65
End: 2025-02-05
Payer: COMMERCIAL

## 2025-03-19 ENCOUNTER — APPOINTMENT (OUTPATIENT)
Dept: OBSTETRICS AND GYNECOLOGY | Facility: CLINIC | Age: 65
End: 2025-03-19
Payer: COMMERCIAL

## 2025-03-19 VITALS
DIASTOLIC BLOOD PRESSURE: 80 MMHG | HEIGHT: 66 IN | BODY MASS INDEX: 25.39 KG/M2 | SYSTOLIC BLOOD PRESSURE: 130 MMHG | WEIGHT: 158 LBS

## 2025-03-19 DIAGNOSIS — Z01.419 ENCOUNTER FOR GYNECOLOGICAL EXAMINATION WITHOUT ABNORMAL FINDING: Primary | ICD-10-CM

## 2025-03-19 PROCEDURE — 87624 HPV HI-RISK TYP POOLED RSLT: CPT

## 2025-03-19 PROCEDURE — 3008F BODY MASS INDEX DOCD: CPT | Performed by: OBSTETRICS & GYNECOLOGY

## 2025-03-19 PROCEDURE — 99396 PREV VISIT EST AGE 40-64: CPT | Performed by: OBSTETRICS & GYNECOLOGY

## 2025-03-19 PROCEDURE — 88175 CYTOPATH C/V AUTO FLUID REDO: CPT

## 2025-03-19 NOTE — PROGRESS NOTES
Subjective   Caroline Corbin is a 65 y.o. female here for a routine exam.  She has a history of endometrial atypical hyperplasia and had a TLH with BSO May 31, 2023 with Dr. Coy of GYN oncology.    She has no bleeding or vaginal discharge.  No dysuria or change in bowel habits.  She is current on her colonoscopy.    A bone density in 2023 was normal.  Personal health questionnaire reviewed: yes.     Gynecologic History  Patient's last menstrual period was 2014 (approximate).  Contraception: status post hysterectomy  Last Pap: 21. Results were: normal  Last mammogram: 3/13/24. Results were: normal    Obstetric History  OB History    Para Term  AB Living   3 3       3   SAB IAB Ectopic Multiple Live Births                  # Outcome Date GA Lbr Perry/2nd Weight Sex Type Anes PTL Lv   3 Para            2 Para            1 Para                Objective   Constitutional: Alert and in no acute distress. Well developed, well nourished.   Head and Face: Head and face: Normal.    Eyes: Normal external exam - nonicteric sclera, extraocular movements intact (EOMI) and no ptosis.   Neck: No neck asymmetry. Supple. Thyroid not enlarged and there were no palpable thyroid nodules.    Pulmonary: No respiratory distress.   Chest: Breasts: Normal appearance, no nipple discharge and no skin changes. Palpation of breasts and axillae: No palpable mass and no axillary lymphadenopathy.   Abdomen: Soft nontender; no abdominal mass palpated. No organomegaly. No hernias.   Genitourinary: External genitalia: Normal. No inguinal lymphadenopathy. Bartholin's Urethral and Skenes Glands: Normal. Urethra: Normal.  Bladder: Normal on palpation. Vagina: Normal. Cervix: Absent.  Uterus: Absent.  Right Adnexa/parametria: Absent.  Left Adnexa/parametria: Absent.  Inspection of Perianal Area: Normal.   Musculoskeletal: No joint swelling seen, normal movements of all extremities.   Skin: Normal skin color and  pigmentation, normal skin turgor, and no rash.   Neurologic: Non-focal. Grossly intact.   Psychiatric: Alert and oriented x 3. Affect normal to patient baseline. Mood: Appropriate.  Physical Exam     Assessment/Plan   Healthy female exam.  This is a 65-year-old female with a normal exam.  She has a history of a hysterectomy for endometrial atypical hyperplasia.  A vaginal Pap was sent, likely no further Pap smears will be needed.    Her routine mammogram was ordered with tomosynthesis.    I will see her in 1 year.  Education reviewed: self breast exams.  Mammogram ordered.

## 2025-03-26 ENCOUNTER — HOSPITAL ENCOUNTER (OUTPATIENT)
Dept: RADIOLOGY | Facility: CLINIC | Age: 65
Discharge: HOME | End: 2025-03-26
Payer: COMMERCIAL

## 2025-03-26 DIAGNOSIS — Z01.419 ENCOUNTER FOR GYNECOLOGICAL EXAMINATION WITHOUT ABNORMAL FINDING: ICD-10-CM

## 2025-03-26 PROCEDURE — 77063 BREAST TOMOSYNTHESIS BI: CPT | Performed by: RADIOLOGY

## 2025-03-26 PROCEDURE — 77067 SCR MAMMO BI INCL CAD: CPT | Performed by: RADIOLOGY

## 2025-03-26 PROCEDURE — 77067 SCR MAMMO BI INCL CAD: CPT

## 2025-05-09 ENCOUNTER — OFFICE VISIT (OUTPATIENT)
Dept: URGENT CARE | Age: 65
End: 2025-05-09
Payer: COMMERCIAL

## 2025-05-09 VITALS
RESPIRATION RATE: 16 BRPM | SYSTOLIC BLOOD PRESSURE: 138 MMHG | TEMPERATURE: 98.4 F | OXYGEN SATURATION: 98 % | HEART RATE: 76 BPM | DIASTOLIC BLOOD PRESSURE: 77 MMHG

## 2025-05-09 DIAGNOSIS — J02.0 STREP THROAT: Primary | ICD-10-CM

## 2025-05-09 DIAGNOSIS — J10.1 INFLUENZA B: ICD-10-CM

## 2025-05-09 DIAGNOSIS — R68.89 FLU-LIKE SYMPTOMS: ICD-10-CM

## 2025-05-09 LAB
POC HUMAN RHINOVIRUS PCR: NEGATIVE
POC INFLUENZA A VIRUS PCR: NEGATIVE
POC INFLUENZA B VIRUS PCR: POSITIVE
POC RESPIRATORY SYNCYTIAL VIRUS PCR: NEGATIVE
POC STREPTOCOCCUS PYOGENES (GROUP A STREP) PCR: POSITIVE

## 2025-05-09 RX ORDER — AZITHROMYCIN 250 MG/1
TABLET, FILM COATED ORAL
Qty: 6 TABLET | Refills: 0 | Status: SHIPPED | OUTPATIENT
Start: 2025-05-09

## 2025-05-09 ASSESSMENT — ENCOUNTER SYMPTOMS
FEVER: 1
FATIGUE: 1
SINUS PRESSURE: 0
SINUS PAIN: 0
SORE THROAT: 1

## 2025-05-09 ASSESSMENT — PAIN SCALES - GENERAL: PAINLEVEL_OUTOF10: 0-NO PAIN

## 2025-05-09 NOTE — PROGRESS NOTES
Subjective   Patient ID: Caroline Corbin is a 65 y.o. female. They present today with a chief complaint of URI (HA, fever, ST, congestion X 5 days. ).    History of Present Illness    URI  Presenting symptoms: congestion, ear pain, fatigue, fever and sore throat    Associated symptoms: no sinus pain        Patient presents to urgent care for complaints of headache, fever, sore throat and congestion for 5 days.  She has been using Tylenol, Motrin and Mucinex.  Reports no improvement of her symptoms.    Past Medical History  Allergies as of 05/09/2025 - Reviewed 05/09/2025   Allergen Reaction Noted    Penicillins Hives 07/18/2023       Prescriptions Prior to Admission[1]     Medical History[2]    Surgical History[3]     reports that she has never smoked. She has never used smokeless tobacco. She reports that she does not currently use alcohol. She reports that she does not use drugs.    Review of Systems  Review of Systems   Constitutional:  Positive for fatigue and fever.   HENT:  Positive for congestion, ear pain and sore throat. Negative for sinus pressure and sinus pain.                                   Objective    Vitals:    05/09/25 1346   BP: 138/77   Pulse: 76   Resp: 16   Temp: 36.9 °C (98.4 °F)   SpO2: 98%     Patient's last menstrual period was 01/01/2014 (approximate).    Physical Exam  Vitals reviewed.   Constitutional:       Appearance: Normal appearance.   HENT:      Head: Normocephalic.      Right Ear: Tympanic membrane, ear canal and external ear normal.      Left Ear: Tympanic membrane, ear canal and external ear normal.      Nose: Congestion present.      Mouth/Throat:      Mouth: Mucous membranes are moist.      Pharynx: Posterior oropharyngeal erythema present. No oropharyngeal exudate.   Eyes:      Conjunctiva/sclera: Conjunctivae normal.   Cardiovascular:      Rate and Rhythm: Normal rate and regular rhythm.      Heart sounds: Normal heart sounds.   Pulmonary:      Effort: Pulmonary effort  is normal.      Breath sounds: Normal breath sounds.   Lymphadenopathy:      Cervical: Cervical adenopathy present.   Skin:     General: Skin is warm and dry.   Neurological:      Mental Status: She is alert.         Procedures    Point of Care Test & Imaging Results from this visit  Results for orders placed or performed in visit on 05/09/25   POCT SPOTFIRE R/ST Panel Mini w/Strep A ([facility]) manually resulted   Result Value Ref Range    POC Group A Strep, PCR Positive (A) Negative    POC Respiratory Syncytial Virus PCR Negative Negative    POC Influenza A Virus PCR Negative Negative    POC Influenza B Virus PCR Positive (A) Negative    POC Human Rhinovirus PCR Negative Negative      Imaging  No results found.    Cardiology, Vascular, and Other Imaging  No other imaging results found for the past 2 days      Diagnostic study results (if any) were reviewed by ZACH Vasquez.    Assessment/Plan   Allergies, medications, history, and pertinent labs/EKGs/Imaging reviewed by ZACH Vasquez.     Medical Decision Making  Patient tested positive for influenza B and strep.  Will place her on azithromycin for the strep.  She was told to continue Tylenol and Motrin as needed for fever/pain.  Make sure you are staying hydrated with fluids.  If you symptoms are not improving or worsening follow-up with PCP or return to urgent care if needed.    At time of discharge patient was clinically well-appearing and HDS for outpatient management. The patient and/or family was educated regarding diagnosis, supportive care, OTC and Rx medications. The patient and/or family was given the opportunity to ask questions prior to discharge.  They verbalized understanding of my discussion of the plans for treatment, expected course, indications to return to  or seek further evaluation in ED, and the need for timely follow up as directed.   They were provided with a work/school excuse if requested.      Orders and  Diagnoses  Diagnoses and all orders for this visit:  Strep throat  -     azithromycin (Zithromax) 250 mg tablet; Take 2 tabs (500 mg) by mouth today, than 1 daily for 4 days.  Flu-like symptoms  -     POCT SPOTFIRE R/ST Panel Mini w/Strep A ([facility]) manually resulted  Influenza B      Medical Admin Record      Patient disposition: Home    Electronically signed by ZACH Vasquez  2:18 PM           [1] (Not in a hospital admission)  [2] No past medical history on file.  [3]   Past Surgical History:  Procedure Laterality Date    HYSTERECTOMY  5-31-23    JOINT REPLACEMENT  6-1-21

## 2025-10-09 ENCOUNTER — APPOINTMENT (OUTPATIENT)
Dept: PRIMARY CARE | Facility: CLINIC | Age: 65
End: 2025-10-09
Payer: COMMERCIAL

## 2025-12-30 ENCOUNTER — APPOINTMENT (OUTPATIENT)
Dept: PRIMARY CARE | Facility: CLINIC | Age: 65
End: 2025-12-30
Payer: COMMERCIAL

## 2026-04-15 ENCOUNTER — APPOINTMENT (OUTPATIENT)
Dept: OBSTETRICS AND GYNECOLOGY | Facility: CLINIC | Age: 66
End: 2026-04-15
Payer: COMMERCIAL

## 2026-04-29 ENCOUNTER — APPOINTMENT (OUTPATIENT)
Dept: OBSTETRICS AND GYNECOLOGY | Facility: CLINIC | Age: 66
End: 2026-04-29
Payer: COMMERCIAL